# Patient Record
Sex: MALE | Race: WHITE | NOT HISPANIC OR LATINO | URBAN - METROPOLITAN AREA
[De-identification: names, ages, dates, MRNs, and addresses within clinical notes are randomized per-mention and may not be internally consistent; named-entity substitution may affect disease eponyms.]

---

## 2020-11-16 ENCOUNTER — OFFICE VISIT (OUTPATIENT)
Dept: URBAN - METROPOLITAN AREA CLINIC 78 | Facility: CLINIC | Age: 55
End: 2020-11-16
Payer: COMMERCIAL

## 2020-11-16 DIAGNOSIS — R10.9 RECURRENT ABDOMINAL PAIN: ICD-10-CM

## 2020-11-16 DIAGNOSIS — R10.84 LEFT LOWER ABDOMINAL PAIN: ICD-10-CM

## 2020-11-16 DIAGNOSIS — R52 TENDERNESS DETERMINED BY EXAMINATION: ICD-10-CM

## 2020-11-16 PROCEDURE — 99213 OFFICE O/P EST LOW 20 MIN: CPT | Performed by: INTERNAL MEDICINE

## 2020-11-16 PROCEDURE — G8482 FLU IMMUNIZE ORDER/ADMIN: HCPCS | Performed by: INTERNAL MEDICINE

## 2020-11-16 RX ORDER — MULTIVITAMIN
1 TABLET TABLET ORAL ONCE A DAY
Status: ACTIVE | COMMUNITY

## 2020-11-16 NOTE — PHYSICAL EXAM GASTROINTESTINAL
Abdomen , soft, left lower quadrant tenderness ,nondistended , no guarding or rigidity , no masses palpable , normal bowel sounds , Liver and Spleen , no hepatomegaly present , liver nontender , spleen not palpable

## 2020-11-16 NOTE — HPI-TODAY'S VISIT:
Patient known to me underwent colonoscopy in 2019  Polyps removed and few small outpockets noted   Patient  had episode of left inguinal pain . PCP dx and treated him for diverticulitis after checking UA and labs .  He  was Rx Cipro and flagyl  He has another episodes of  inguinal pain . They tested his UA and gave him abx    Denies nausea /vomiting /fever / chills  Baseline  BM : regular  During painful episodes it was diarrhea  Now his BM starts semi solid and then becomes loose stools  Patient states that his left lower quadrant  is still persistent and reproducible despite abx   Denies rectal bleeding

## 2020-11-23 ENCOUNTER — TELEPHONE ENCOUNTER (OUTPATIENT)
Dept: URBAN - METROPOLITAN AREA CLINIC 78 | Facility: CLINIC | Age: 55
End: 2020-11-23

## 2020-12-01 ENCOUNTER — OFFICE VISIT (OUTPATIENT)
Dept: URBAN - METROPOLITAN AREA CLINIC 78 | Facility: CLINIC | Age: 55
End: 2020-12-01
Payer: COMMERCIAL

## 2020-12-01 DIAGNOSIS — K57.32 DIVERTICULITIS LARGE INTESTINE W/O PERFORATION OR ABSCESS W/O BLEEDING: ICD-10-CM

## 2020-12-01 PROBLEM — 111359004 DIVERTICULITIS OF COLON: Status: ACTIVE | Noted: 2020-12-01

## 2020-12-01 PROCEDURE — G8420 CALC BMI NORM PARAMETERS: HCPCS | Performed by: INTERNAL MEDICINE

## 2020-12-01 PROCEDURE — 3017F COLORECTAL CA SCREEN DOC REV: CPT | Performed by: INTERNAL MEDICINE

## 2020-12-01 PROCEDURE — 99214 OFFICE O/P EST MOD 30 MIN: CPT | Performed by: INTERNAL MEDICINE

## 2020-12-01 PROCEDURE — G8482 FLU IMMUNIZE ORDER/ADMIN: HCPCS | Performed by: INTERNAL MEDICINE

## 2020-12-01 PROCEDURE — G8427 DOCREV CUR MEDS BY ELIG CLIN: HCPCS | Performed by: INTERNAL MEDICINE

## 2020-12-01 RX ORDER — MULTIVITAMIN
1 TABLET TABLET ORAL ONCE A DAY
Status: ACTIVE | COMMUNITY

## 2020-12-01 RX ORDER — AMOXICILLIN AND CLAVULANATE POTASSIUM 875; 125 MG/1; MG/1
1 TABLET TABLET, FILM COATED ORAL
Qty: 20 | Refills: 0 | OUTPATIENT
Start: 2020-12-01 | End: 2020-12-11

## 2020-12-01 NOTE — HPI-TODAY'S VISIT:
Patient is here in f/u He has had 2 episodes of Divertiiculitis - one 7 m ago and the last one in november 2020 HE has been treated withCipro/Flagyl x 10 days on both occassions HE feels his pain persists He had a CT scan of the abdo and pelvis done recently It showed Sigmoid DIverticulitis

## 2020-12-21 ENCOUNTER — OFFICE VISIT (OUTPATIENT)
Dept: URBAN - METROPOLITAN AREA CLINIC 78 | Facility: CLINIC | Age: 55
End: 2020-12-21

## 2021-01-29 ENCOUNTER — TELEPHONE ENCOUNTER (OUTPATIENT)
Dept: URBAN - METROPOLITAN AREA CLINIC 78 | Facility: CLINIC | Age: 56
End: 2021-01-29

## 2021-01-29 RX ORDER — CEFDINIR 300 MG/1
AS DIRECTED CAPSULE ORAL DAILY
Qty: 10 | Refills: 0 | OUTPATIENT
Start: 2021-01-29

## 2021-01-29 RX ORDER — METRONIDAZOLE 500 MG/1
1 TABLET TABLET, FILM COATED ORAL BID
Qty: 20 TABLET | Refills: 0 | OUTPATIENT
Start: 2021-01-29

## 2021-01-29 RX ORDER — MULTIVITAMIN
1 TABLET TABLET ORAL ONCE A DAY
Status: ACTIVE | COMMUNITY

## 2023-03-06 ENCOUNTER — OFFICE VISIT (OUTPATIENT)
Dept: URBAN - METROPOLITAN AREA CLINIC 78 | Facility: CLINIC | Age: 58
End: 2023-03-06
Payer: COMMERCIAL

## 2023-03-06 ENCOUNTER — DASHBOARD ENCOUNTERS (OUTPATIENT)
Age: 58
End: 2023-03-06

## 2023-03-06 VITALS
HEART RATE: 68 BPM | DIASTOLIC BLOOD PRESSURE: 73 MMHG | HEIGHT: 67 IN | WEIGHT: 155 LBS | TEMPERATURE: 97.7 F | SYSTOLIC BLOOD PRESSURE: 113 MMHG | BODY MASS INDEX: 24.33 KG/M2

## 2023-03-06 DIAGNOSIS — D50.0 IRON DEFICIENCY ANEMIA DUE TO CHRONIC BLOOD LOSS: ICD-10-CM

## 2023-03-06 DIAGNOSIS — K57.32 DIVERTICULITIS LARGE INTESTINE: ICD-10-CM

## 2023-03-06 DIAGNOSIS — Z86.010 PERSONAL HISTORY OF COLONIC POLYPS: ICD-10-CM

## 2023-03-06 DIAGNOSIS — Z82.49 FH: CABG (CORONARY ARTERY BYPASS SURGERY): ICD-10-CM

## 2023-03-06 DIAGNOSIS — R10.13 DYSPEPSIA: ICD-10-CM

## 2023-03-06 PROBLEM — 724556004: Status: ACTIVE | Noted: 2023-03-06

## 2023-03-06 PROBLEM — 428283002: Status: ACTIVE | Noted: 2023-03-06

## 2023-03-06 PROBLEM — 111359004 DIVERTICULITIS OF COLON: Status: ACTIVE | Noted: 2021-01-29

## 2023-03-06 PROCEDURE — 99214 OFFICE O/P EST MOD 30 MIN: CPT | Performed by: INTERNAL MEDICINE

## 2023-03-06 RX ORDER — METRONIDAZOLE 500 MG/1
1 TABLET TABLET, FILM COATED ORAL BID
Qty: 20 TABLET | Refills: 0 | Status: ON HOLD | COMMUNITY
Start: 2021-01-29

## 2023-03-06 RX ORDER — MULTIVITAMIN
1 TABLET TABLET ORAL ONCE A DAY
Status: ACTIVE | COMMUNITY

## 2023-03-06 RX ORDER — CEFDINIR 300 MG/1
AS DIRECTED CAPSULE ORAL DAILY
Qty: 10 | Refills: 0 | Status: ON HOLD | COMMUNITY
Start: 2021-01-29

## 2023-03-06 RX ORDER — NALTREXONE HYDROCHLORIDE AND BUPROPION HYDROCHLORIDE 8; 90 MG/1; MG/1
2 TABLETS TABLET, EXTENDED RELEASE ORAL TWICE A DAY
Status: ACTIVE | COMMUNITY

## 2023-03-06 RX ORDER — METRONIDAZOLE 500 MG/1
1 TABLET TABLET, FILM COATED ORAL BID
Qty: 20 TABLET | Refills: 0 | OUTPATIENT

## 2023-03-06 RX ORDER — ESCITALOPRAM OXALATE 20 MG/1
1 TABLET TABLET, FILM COATED ORAL ONCE A DAY
Status: ACTIVE | COMMUNITY

## 2023-03-06 RX ORDER — ATORVASTATIN CALCIUM 40 MG/1
1 TABLET TABLET, FILM COATED ORAL ONCE A DAY
Status: ACTIVE | COMMUNITY

## 2023-03-06 RX ORDER — CEFDINIR 300 MG/1
AS DIRECTED CAPSULE ORAL DAILY
Qty: 10 | Refills: 0 | OUTPATIENT

## 2023-03-06 NOTE — HPI-TODAY'S VISIT:
Patient is s/p CABG days ago  ( 2/13/23  at [facility] ) He was released on 17   Patient was on Oxycodone for pain post surgery  Patient was evaluated in ER for abdominal pain and dx with diverticulitis flare  He was d/c with dx of Diverticulitis and Pleural effusion ( post surgery )N Dr Pinzon did stool sudies \ His drug Contrav and Lexapro are helping him to quit smoking and drinking   His labs reviewed :  Hg 8.0 > 10.0    Patient is eating 3-4 onces of meat a day  Labs 3/2/23  TSH 3.57 Ferritin 320  Total iron 38  TIBC 331 % sat 11 CMP is normal   HG 10.9 < 9.4 Hct 32.7 Platelet 459  Dr Kapil Martinez ( cardiology ) [facility]